# Patient Record
Sex: MALE | Race: WHITE | Employment: FULL TIME | URBAN - METROPOLITAN AREA
[De-identification: names, ages, dates, MRNs, and addresses within clinical notes are randomized per-mention and may not be internally consistent; named-entity substitution may affect disease eponyms.]

---

## 2022-11-18 ENCOUNTER — OFFICE VISIT (OUTPATIENT)
Dept: UROLOGY | Age: 32
End: 2022-11-18

## 2022-11-18 DIAGNOSIS — Z30.09 VASECTOMY EVALUATION: Primary | ICD-10-CM

## 2022-11-18 RX ORDER — DIAZEPAM 10 MG/1
10 TABLET ORAL ONCE
Qty: 1 TABLET | Refills: 0 | Status: SHIPPED | OUTPATIENT
Start: 2022-11-18 | End: 2022-11-18

## 2022-11-18 ASSESSMENT — ENCOUNTER SYMPTOMS
EYES NEGATIVE: 1
RESPIRATORY NEGATIVE: 1

## 2022-11-18 NOTE — PROGRESS NOTES
[facility]  [address] [place] [address] Way  [address] [facility], [address] [facility]  [phone]    Gagan Cummings  : 1990    Chief Complaint   Patient presents with    Sterilization     Vas consult         HPI     Gagan Cummings is a 33 y.o. male VAS CONSULT-ALEGRIA: The vasectomy procedure was explained in detail along with the risks of bleeding, infections, recanalization and the need for one negative post-op sperm check to ensure sterility. We also discussed the possibility of anti-sperm antibodies. We talked about the permanence of the procedure, but also the possibility of reversal. We also discussed the literature concerning health risks associated with vasectomy. I told them that at the current time we do not feel that there are any significant health risks associated with vasectomy. We also discussed the risks of acute and chronic pain as well as the risk of sperm granuloma. The instruction booklet given to the patient regarding no scalpel vasectomy was discussed and he has no questions. He was told not to take any aspirin or similar products prior to the procedure. The consent was signed and procedure scheduled. No past medical history on file. No past surgical history on file. Current Outpatient Medications   Medication Sig Dispense Refill    diazePAM (VALIUM) 10 MG tablet Take 1 tablet by mouth once for 1 dose. Take 45 minutes prior to vasectomy 1 tablet 0     No current facility-administered medications for this visit.      No Known Allergies  Social History     Socioeconomic History    Marital status:      Spouse name: Not on file    Number of children: Not on file    Years of education: Not on file    Highest education level: Not on file   Occupational History    Not on file   Tobacco Use    Smoking status: Never    Smokeless tobacco: Never   Substance and Sexual Activity    Alcohol use: Not Currently    Drug use: Not on file    Sexual activity: Yes     Partners: Female   Other Topics Concern    Not on file   Social History Narrative    Not on file     Social Determinants of Health     Financial Resource Strain: Not on file   Food Insecurity: Not on file   Transportation Needs: Not on file   Physical Activity: Not on file   Stress: Not on file   Social Connections: Not on file   Intimate Partner Violence: Not on file   Housing Stability: Not on file     No family history on file. Review of Systems  Constitutional: Negative  Skin: Negative skin ROS  Eyes: Eyes negative  ENT: HENT negative  Respiratory: Respiratory negative  Cardiovascular: Neg cardio ROS  GI: Neg GI ROS  Genitourinary: Genitourinary negative  Musculoskeletal: Musculoskeletal negative  Neurological: Neg neuro ROS  Psychological: Neg psych ROS  Endocrine: Endocrine negative  Hem/Lymphatic: Hematologic/lymphatic negative    There were no vitals taken for this visit. Physical Exam    Assessment and Plan    ICD-10-CM    1. Vasectomy evaluation  Z30.09 diazePAM (VALIUM) 10 MG tablet          No orders of the defined types were placed in this encounter. Follow-up and Dispositions    Return for Schedule vasectomy.

## 2023-01-16 ENCOUNTER — OFFICE VISIT (OUTPATIENT)
Dept: UROLOGY | Age: 33
End: 2023-01-16

## 2023-01-16 VITALS — HEIGHT: 65 IN | WEIGHT: 170 LBS | BODY MASS INDEX: 28.32 KG/M2

## 2023-01-16 DIAGNOSIS — Z30.2 ENCOUNTER FOR VASECTOMY: Primary | ICD-10-CM

## 2023-01-16 RX ORDER — HYDROCODONE BITARTRATE AND ACETAMINOPHEN 5; 325 MG/1; MG/1
1 TABLET ORAL EVERY 6 HOURS PRN
Qty: 12 TABLET | Refills: 0 | Status: SHIPPED | OUTPATIENT
Start: 2023-01-16 | End: 2023-01-19

## 2023-01-16 RX ORDER — DIAZEPAM 10 MG/1
TABLET ORAL
COMMUNITY
Start: 2022-11-18

## 2023-01-16 NOTE — Clinical Note
[facility]  [address] [place] [address] Way  [address] [address]  Phone: [phone]  Fax: [phone]    Alessio Quarles MD        January 16, 2023     Patient: Charles Coulter   YOB: 1990   Date of Visit: 1/16/2023       To Whom It May Concern: It is my medical opinion that Charles Coulter {Work release (duty restriction):59169}. If you have any questions or concerns, please don't hesitate to call.     Sincerely,        Alessio Quarles MD

## 2023-01-16 NOTE — PROGRESS NOTES
VASECTOMY: Patient is placed in the supine position. The scrotum was prepped with betadine and draped in the sterile fashion. We infiltrated the left vas using 2% Xylocaine without epinephrine. We then made a small puncture and brought the vas through the skin using the special no scalpel instrument. A 2 cm segment of vas was removed placing two hemoclips towards the testicle and one hemoclip proximally. The procedure was repeated on the right side in a similar fashion. He tolerated the procedure well. There were no complications. Both incisions were closed with a figure of 8 stitch of 4.0 chromic. A sterile dry dressing was applied with scrotal support. Diagnosis Orders   1. Encounter for vasectomy  HYDROcodone-acetaminophen (NORCO) 5-325 MG per tablet        Return for semen check in 6 weeks.

## 2023-02-27 ENCOUNTER — TELEPHONE (OUTPATIENT)
Dept: UROLOGY | Age: 33
End: 2023-02-27

## 2023-03-01 ENCOUNTER — OFFICE VISIT (OUTPATIENT)
Dept: UROLOGY | Age: 33
End: 2023-03-01

## 2023-03-01 DIAGNOSIS — Z30.09 VASECTOMY EVALUATION: Primary | ICD-10-CM
